# Patient Record
Sex: MALE | Race: WHITE | Employment: PART TIME | URBAN - METROPOLITAN AREA
[De-identification: names, ages, dates, MRNs, and addresses within clinical notes are randomized per-mention and may not be internally consistent; named-entity substitution may affect disease eponyms.]

---

## 2023-12-05 ENCOUNTER — OFFICE VISIT (OUTPATIENT)
Dept: ORTHOPEDIC SURGERY | Facility: CLINIC | Age: 23
End: 2023-12-05
Payer: MEDICAID

## 2023-12-05 ENCOUNTER — APPOINTMENT (OUTPATIENT)
Dept: ORTHOPEDIC SURGERY | Facility: CLINIC | Age: 23
End: 2023-12-05
Payer: MEDICAID

## 2023-12-05 ENCOUNTER — ANCILLARY PROCEDURE (OUTPATIENT)
Dept: RADIOLOGY | Facility: CLINIC | Age: 23
End: 2023-12-05
Payer: MEDICAID

## 2023-12-05 DIAGNOSIS — M25.562 LEFT KNEE PAIN, UNSPECIFIED CHRONICITY: ICD-10-CM

## 2023-12-05 DIAGNOSIS — S83.512A SPRAIN OF ANTERIOR CRUCIATE LIGAMENT OF LEFT KNEE, INITIAL ENCOUNTER: ICD-10-CM

## 2023-12-05 PROCEDURE — 73564 X-RAY EXAM KNEE 4 OR MORE: CPT | Mod: LT

## 2023-12-05 PROCEDURE — 99203 OFFICE O/P NEW LOW 30 MIN: CPT | Performed by: ORTHOPAEDIC SURGERY

## 2023-12-05 PROCEDURE — 73564 X-RAY EXAM KNEE 4 OR MORE: CPT | Mod: LEFT SIDE | Performed by: RADIOLOGY

## 2023-12-06 NOTE — PROGRESS NOTES
HPI  This is a pleasant 23 y.o. male here today for further evaluation of left  knee injury sustained playing basketball few days ago he was making a cut when another player ran into his leg and he felt his leg shift developed effusion.  He presents today for further evaluation.  He is playing basketball and some Professional capacity    ROS  Reviewed and all pertinent positives mentioned in HPI.      EXAM  Patient is in no acute distress.  They are alert and oriented x3.  They are of normal mood and affect.  They are in no acute distress.  The patient's limb is warm and well-perfused.  They have intact sensation to light touch in all lower extremity dermatomes.  There is a minimal effusion.    The patient's quadriceps and hamstring strength is 5 of 5.    The patient can do a straight leg raise with no radicular pain.  Grade 2. negative posterior drawer.  There is no patellofemoral crepitus.   The knee is stable to varus and valgus stress at both 0 and 30°.  There is tenderness along the medial joint line.  positive McMurrays      IMAGING  Imaging reviewed today reveal no gross fracture or dislocation.  Preserved joint spaces.  MRI reviewed reveals No MRI for review      ASSESSMENT  left meniscus tear and ACL rupture      PLAN  Based on the patient's mechanism and effusion today we are recommending an MRI scan done urgently to evaluate for an anterior cruciate ligament injury as well as associated medial pathology.  Plan to see him back after the MRI is performed

## 2023-12-13 ENCOUNTER — APPOINTMENT (OUTPATIENT)
Dept: ORTHOPEDIC SURGERY | Facility: HOSPITAL | Age: 23
End: 2023-12-13
Payer: MEDICAID

## 2023-12-20 ENCOUNTER — PREP FOR PROCEDURE (OUTPATIENT)
Dept: ORTHOPEDIC SURGERY | Facility: HOSPITAL | Age: 23
End: 2023-12-20

## 2023-12-20 ENCOUNTER — OFFICE VISIT (OUTPATIENT)
Dept: ORTHOPEDIC SURGERY | Facility: HOSPITAL | Age: 23
End: 2023-12-20
Payer: MEDICAID

## 2023-12-20 ENCOUNTER — HOSPITAL ENCOUNTER (OUTPATIENT)
Facility: HOSPITAL | Age: 23
Setting detail: OUTPATIENT SURGERY
End: 2023-12-20
Attending: ORTHOPAEDIC SURGERY | Admitting: ORTHOPAEDIC SURGERY
Payer: MEDICAID

## 2023-12-20 ENCOUNTER — DOCUMENTATION (OUTPATIENT)
Dept: SPORTS MEDICINE | Facility: HOSPITAL | Age: 23
End: 2023-12-20
Payer: MEDICAID

## 2023-12-20 DIAGNOSIS — S83.272D COMPLEX TEAR OF LATERAL MENISCUS OF LEFT KNEE, SUBSEQUENT ENCOUNTER: ICD-10-CM

## 2023-12-20 DIAGNOSIS — S83.512D TEAR OF ANTERIOR CRUCIATE LIGAMENT, COMPLETE, LEFT, SUBSEQUENT ENCOUNTER: ICD-10-CM

## 2023-12-20 DIAGNOSIS — S83.512A SPRAIN OF ANTERIOR CRUCIATE LIGAMENT OF LEFT KNEE, INITIAL ENCOUNTER: Primary | ICD-10-CM

## 2023-12-20 PROCEDURE — 99213 OFFICE O/P EST LOW 20 MIN: CPT | Performed by: ORTHOPAEDIC SURGERY

## 2023-12-20 PROCEDURE — L1833 KO ADJ JNT POS R SUP PRE OTS: HCPCS | Performed by: PHYSICIAN ASSISTANT

## 2023-12-20 PROCEDURE — 1036F TOBACCO NON-USER: CPT | Performed by: ORTHOPAEDIC SURGERY

## 2023-12-20 RX ORDER — AZATHIOPRINE 50 MG/1
100 TABLET ORAL DAILY
COMMUNITY

## 2023-12-20 NOTE — PROGRESS NOTES
Venkat is a 23-year-old male referred by Dr. Kenney for an injury to his left knee.  He injured his left knee about a month ago playing basketball.  He states he went to make a cut and felt his knee shift and give out.  He had immediate sharp pain and swelling.  He states over the last couple weeks his swelling and range of motion have improved but unfortunately continues to feel unstable with any type of activity.  He has not attempted to return back to basketball.    A brief physical exam of the left knee showed positive Lachman's and anterior drawer.  Minimal joint effusion.  Active range of motion 0 to 130 degrees.    MRI of the left knee dated 12/13/2023 from an outside facility was reviewed today.  Is demonstrated a complete tear to the ACL with associated pivot shift contusions.  The tibia is translated anteriorly in relation to the femur.  Medial and lateral menisci appear intact.    I reviewed with the patient and his mother today the presence of an ACL tear and an active male.  Given his desire to remain active and competitive in sports left knee ACL reconstruction with patella tendon autograft is indicated.  Risks and benefits of surgery as well as the usual postoperative course were reviewed with them today.  We discussed the time of surgery both menisci will be evaluated carefully and if any pathology is noted will be addressed at that time.  We reviewed that it typically takes about 9 months to return back to sports fully.  He does have a history of autoimmune hepatitis and is currently on an immunosuppressant.  I recommended he speak to his prescribing provider at [facility] to discuss when to safely stop and restart Imuran.  He and his mother verbalized understanding of this.  Will schedule surgery for him within the next couple of weeks.    ACL Preop discussion    Risks of knee arthroscopy were discussed with the patient at length. These include but are not limited to: Cardiovascular compromise,  anesthetic complications infection, bleeding, neurovascular injury, blood clots, persistent pain, and stiffness.  The postoperative course regarding the use of medications, crutches, possible brace, care for the incision, and physical therapy were also outlined. The patient voices understanding of these risks and postoperative course.  Complications specific to ACL reconstruction surgery include: loss of terminal knee flexion or extension, recurrent ligament rupture, implant related complications, development of knee adhesions, potential for additional surgery on the knee in the future, progression of knee degenerative chondral changes, and rupture of the native ACL. A small percentage of patients report an inability to return to their pre injury level of athletics.    A post operative hinged ACL brace is prescribed by me for post operative stabilization of the leg until quadriceps muscle strength returns and for protection of the ligament reconstruction.   Autograft  We discussed ACL graft reconstruction options. After an informed discussion, the patient and I elected to utilize bone-patellar tendon-bone autograft. We discussed the good clinical results and strength of the graft with this option. There is a potential for anterior knee pain, patellar tendinitis, and focal area of numbness around the incision.     This office note was dictated using Dragon voice to text software and was not proofread for spelling or grammatical errors

## 2023-12-21 ENCOUNTER — TELEPHONE (OUTPATIENT)
Dept: ORTHOPEDIC SURGERY | Facility: CLINIC | Age: 23
End: 2023-12-21
Payer: MEDICAID

## 2023-12-21 ENCOUNTER — TELEPHONE (OUTPATIENT)
Dept: ORTHOPEDIC SURGERY | Facility: HOSPITAL | Age: 23
End: 2023-12-21
Payer: MEDICAID

## 2023-12-21 NOTE — TELEPHONE ENCOUNTER
Discussed the message from [facility] pre-cert., pt. recalls that Dr. Kenney noted reaching out to his insurance company directly asking them to cover the visits with him, he reports they are going to in the end. I will reach out to his office to see if they recall that, I advised he reach out to his insurance company to see if there is any wiggle room and if we can get an exception. He understand, we will keep him posted. Thanked me for reaching out and there were no further needs.

## 2023-12-21 NOTE — TELEPHONE ENCOUNTER
Mindy Rodríguez from [facility] pre-cert called. His insurance is coming back saying out of network. She wants to know how the office would like to proceed.

## 2023-12-29 ENCOUNTER — TELEPHONE (OUTPATIENT)
Dept: ORTHOPEDIC SURGERY | Facility: HOSPITAL | Age: 23
End: 2023-12-29
Payer: MEDICAID

## 2023-12-29 ENCOUNTER — TELEMEDICINE CLINICAL SUPPORT (OUTPATIENT)
Dept: PREADMISSION TESTING | Facility: HOSPITAL | Age: 23
End: 2023-12-29
Payer: MEDICAID

## 2023-12-29 VITALS — HEIGHT: 74 IN | BODY MASS INDEX: 24.26 KG/M2 | WEIGHT: 189 LBS

## 2023-12-29 RX ORDER — IBUPROFEN 200 MG
400 TABLET ORAL EVERY 6 HOURS PRN
COMMUNITY

## 2023-12-29 RX ORDER — ACETAMINOPHEN 500 MG
1000 TABLET ORAL EVERY 6 HOURS PRN
COMMUNITY

## 2023-12-29 RX ORDER — CLONAZEPAM 1 MG/1
1 TABLET ORAL 2 TIMES DAILY PRN
COMMUNITY

## 2023-12-29 NOTE — NURSING NOTE
On 12/29/23 a phone screening was completed.  Preop meds and instructions were given and explained. Jeff DO

## 2023-12-29 NOTE — TELEPHONE ENCOUNTER
Thank you for taking the time to speak with me today regarding your durable medical equipment (DME) for your upcoming surgery.  As discussed you confirmed that you need additional equipment and have agreed to purchase:  Crutches T-Scope    Please feel free to call with any additional questions or concerns regarding medical equipment for recovery.    Vitaliy Rucker - [facility]  [phone]

## 2023-12-29 NOTE — PREPROCEDURE INSTRUCTIONS
Medication List            Accurate as of December 29, 2023  4:05 PM. Always use your most recent med list.                acetaminophen 500 mg tablet  Commonly known as: Tylenol  Take as needed   azaTHIOprine 50 mg tablet  Commonly known as: Imuran  Check with prescriber for stoppage   clonazePAM 1 mg tablet  Commonly known as: KlonoPIN  Take as needed   ibuprofen 200 mg tablet  Stop 5 days prior tosurgery                            NPO Instructions:    Do not eat any food after midnight the night before your surgery/procedure.    Additional Instructions:     Seven/Six Days before Surgery:  Review your medication instructions, stop indicated medications  Five Days before Surgery:  Review your medication instructions, stop indicated medications  Three Days before Surgery:  Review your medication instructions, stop indicated medications  The Day before Surgery:  Review your medication instructions, stop indicated medications  You will be contacted regarding the time of your arrival to facility and surgery time  Do not eat any food after Midnight  Day of Surgery:  Review your medication instructions, take indicated medications  Wear  comfortable loose fitting clothing  Do not use moisturizers, creams, lotions or perfume  All jewelry and valuables should be left at home    All instructions given and explained over the phone during phone screen.

## 2024-01-03 ENCOUNTER — TELEPHONE (OUTPATIENT)
Dept: ORTHOPEDIC SURGERY | Facility: HOSPITAL | Age: 24
End: 2024-01-03
Payer: MEDICAID

## 2024-01-03 DIAGNOSIS — S83.272D COMPLEX TEAR OF LATERAL MENISCUS OF LEFT KNEE, SUBSEQUENT ENCOUNTER: ICD-10-CM

## 2024-01-03 DIAGNOSIS — S83.512D TEAR OF ANTERIOR CRUCIATE LIGAMENT, COMPLETE, LEFT, SUBSEQUENT ENCOUNTER: Primary | ICD-10-CM

## 2024-01-03 NOTE — PROGRESS NOTES
Patient returns see me today for his knee.  His MRI scan reveals that he has a tear of his ACL as well as potentially some meniscal damage or to get him set up to see Dr. Sekou Franklin for consideration of a ACL reconstruction with patellar tendon autograft

## 2024-01-04 ENCOUNTER — PREP FOR PROCEDURE (OUTPATIENT)
Dept: ORTHOPEDIC SURGERY | Facility: HOSPITAL | Age: 24
End: 2024-01-04
Payer: MEDICAID

## 2024-01-04 ENCOUNTER — ANESTHESIA EVENT (OUTPATIENT)
Dept: OPERATING ROOM | Facility: HOSPITAL | Age: 24
End: 2024-01-04

## 2024-01-04 RX ORDER — SODIUM CHLORIDE, SODIUM LACTATE, POTASSIUM CHLORIDE, CALCIUM CHLORIDE 600; 310; 30; 20 MG/100ML; MG/100ML; MG/100ML; MG/100ML
100 INJECTION, SOLUTION INTRAVENOUS CONTINUOUS
Status: CANCELLED | OUTPATIENT
Start: 2024-01-04

## 2024-01-09 ENCOUNTER — ANESTHESIA (OUTPATIENT)
Dept: OPERATING ROOM | Facility: HOSPITAL | Age: 24
End: 2024-01-09
Payer: MEDICAID

## 2024-01-16 ENCOUNTER — TELEPHONE (OUTPATIENT)
Dept: ORTHOPEDIC SURGERY | Facility: HOSPITAL | Age: 24
End: 2024-01-16
Payer: MEDICAID

## 2024-01-16 NOTE — TELEPHONE ENCOUNTER
Spoke with Dee Dee, she was able to schedule one appointment with [facility] in [place]. Discussed that  does not par with their insurance company so they likely will have an out of pocket cost. They may need to contact the insurance company for a list of in-network facilities and pick one that has a sports medicine specialty. She expressed understanding. There were no further needs.

## 2024-01-22 ENCOUNTER — APPOINTMENT (OUTPATIENT)
Dept: ORTHOPEDIC SURGERY | Facility: HOSPITAL | Age: 24
End: 2024-01-22
Payer: MEDICAID

## 2024-01-22 ENCOUNTER — PREP FOR PROCEDURE (OUTPATIENT)
Dept: ORTHOPEDIC SURGERY | Facility: HOSPITAL | Age: 24
End: 2024-01-22
Payer: MEDICAID

## 2024-01-22 RX ORDER — SODIUM CHLORIDE, SODIUM LACTATE, POTASSIUM CHLORIDE, CALCIUM CHLORIDE 600; 310; 30; 20 MG/100ML; MG/100ML; MG/100ML; MG/100ML
100 INJECTION, SOLUTION INTRAVENOUS CONTINUOUS
Status: CANCELLED | OUTPATIENT
Start: 2024-01-22

## 2024-01-23 ENCOUNTER — TELEPHONE (OUTPATIENT)
Dept: ORTHOPEDIC SURGERY | Facility: CLINIC | Age: 24
End: 2024-01-23
Payer: MEDICAID

## 2024-02-02 ENCOUNTER — APPOINTMENT (OUTPATIENT)
Dept: PHYSICAL THERAPY | Facility: CLINIC | Age: 24
End: 2024-02-02
Payer: MEDICAID

## 2024-02-15 ENCOUNTER — APPOINTMENT (OUTPATIENT)
Dept: ORTHOPEDIC SURGERY | Facility: HOSPITAL | Age: 24
End: 2024-02-15
Payer: MEDICAID